# Patient Record
Sex: MALE | Race: WHITE | NOT HISPANIC OR LATINO | Employment: FULL TIME | URBAN - METROPOLITAN AREA
[De-identification: names, ages, dates, MRNs, and addresses within clinical notes are randomized per-mention and may not be internally consistent; named-entity substitution may affect disease eponyms.]

---

## 2019-04-13 ENCOUNTER — APPOINTMENT (OUTPATIENT)
Dept: RADIOLOGY | Facility: MEDICAL CENTER | Age: 24
End: 2019-04-13
Attending: EMERGENCY MEDICINE
Payer: COMMERCIAL

## 2019-04-13 ENCOUNTER — HOSPITAL ENCOUNTER (EMERGENCY)
Facility: MEDICAL CENTER | Age: 24
End: 2019-04-13
Attending: EMERGENCY MEDICINE
Payer: COMMERCIAL

## 2019-04-13 VITALS
DIASTOLIC BLOOD PRESSURE: 73 MMHG | OXYGEN SATURATION: 96 % | RESPIRATION RATE: 17 BRPM | HEART RATE: 70 BPM | HEIGHT: 72 IN | BODY MASS INDEX: 19.64 KG/M2 | SYSTOLIC BLOOD PRESSURE: 125 MMHG | WEIGHT: 145 LBS | TEMPERATURE: 98.8 F

## 2019-04-13 DIAGNOSIS — S62.339A CLOSED BOXER'S FRACTURE, INITIAL ENCOUNTER: ICD-10-CM

## 2019-04-13 PROCEDURE — 73130 X-RAY EXAM OF HAND: CPT | Mod: RT

## 2019-04-13 PROCEDURE — 302874 HCHG BANDAGE ACE 2 OR 3"": Mod: EDC

## 2019-04-13 PROCEDURE — 99284 EMERGENCY DEPT VISIT MOD MDM: CPT | Mod: EDC

## 2019-04-13 PROCEDURE — 29125 APPL SHORT ARM SPLINT STATIC: CPT | Mod: EDC

## 2019-04-13 NOTE — ED PROVIDER NOTES
ED Provider Note    CHIEF COMPLAINT  Chief Complaint   Patient presents with   • T-5000 Extremity Pain       HPI  Joni Travis Jr. is a 23 y.o. male who presents for evaluation of a right hand injury sustained 2 days ago when he punched a door.  Pain overlying the ulnar aspect of the hand, no weakness numbness or tingling, he denies any wrist pain or elbow pain.  He has no other complaints, right-hand dominant.    REVIEW OF SYSTEMS  Negative for fever, weakness, numbness, tingling    PAST MEDICAL HISTORY       SOCIAL HISTORY  Social History     Social History Main Topics   • Smoking status: Never Smoker   • Smokeless tobacco: Never Used   • Alcohol use Yes      Comment: 2-3 drinks every other day   • Drug use: No   • Sexual activity: Not on file       SURGICAL HISTORY  patient denies any surgical history    CURRENT MEDICATIONS  I personally reviewed the medication list in the charting documentation.     ALLERGIES  No Known Allergies    PHYSICAL EXAM  VITAL SIGNS: /72   Pulse 69   Temp 36.4 °C (97.5 °F) (Temporal)   Resp 17   Ht 1.829 m (6')   Wt 65.8 kg (145 lb)   SpO2 97%   BMI 19.67 kg/m²   Constitutional: Alert in no apparent distress.  HENT: No signs of trauma.   Eyes: Conjunctiva normal, Non-icteric.   Chest: Normal nonlabored respirations  Skin: No erythema, No rash.   Musculoskeletal: Edema and ecchymosis overlying the ulnar aspect of the right hand with near full range of motion but discomfort.  He has normal sensation and capillary refill distally.  Neurologic: Alert, No focal deficits noted.   Psychiatric: Affect normal, Judgment normal.    DIAGNOSTIC STUDIES / PROCEDURES    RADIOLOGY  DX-HAND 3+ RIGHT   Final Result      Acute, closed, fracture of the fifth metacarpal neck with volar angulation.            COURSE & MEDICAL DECISION MAKING  Pertinent Labs & Imaging studies reviewed. (See chart for details)    Encounter Summary: This is a 23 y.o. male with right hand injury after punching  a door, likely a fifth metacarpal fracture, she will be obtained, he is neurovascularly intact ------ x-ray confirms the presence of this fracture, the degree of angulation seems to be greater than 40 degrees based on my own measurements of the x-ray so a bedside reduction was performed, I discussed with the patient prior regarding any pain medicine or sedation and he elected to proceed without either, then applying traction to the fifth digit and direct manipulation to the region of the metacarpal head to reduce the volar angulation.  This was then splinted by myself and the tech in a ulnar gutter, he will follow-up with the orthopedic surgeon, return instructions discussed      DISPOSITION: Discharge Home      FINAL IMPRESSION  1. Closed boxer's fracture, initial encounter        This dictation was created using voice recognition software. The accuracy of the dictation is limited to the abilities of the software. I expect there may be some errors of grammar and possibly content. The nursing notes were reviewed and certain aspects of this information were incorporated into this note.    Electronically signed by: Benito Marcos, 4/13/2019 11:58 AM

## 2019-04-13 NOTE — ED NOTES
Pt ambulatory to Peds 53. Agree with triage RN note. Pt awake and alert. Pain and swelling to R lateral hand. CMS intact. Call light within reach. Denies additional needs. Up for ERP eval.

## 2019-04-13 NOTE — ED NOTES
Discharge instructions reviewed with pt regarding boxer's fracture.  Pt instructed on signs and symptoms to return to ED, instructed on importance of oral hydration, no questions regarding this.   Instructed to follow-up with   Goyo Espino M.D.  [address]  [address]  [place] [address]  [phone]    Schedule an appointment as soon as possible for a visit       Pt has no questions at this time, /73   Pulse 70   Temp 37.1 °C (98.8 °F) (Temporal)   Resp 17   Ht 1.829 m (6')   Wt 65.8 kg (145 lb)   SpO2 96%   BMI 19.67 kg/m²     Pt leaves alert, age appropriate and in NAD.

## 2019-04-13 NOTE — ED NOTES
Ulnar gutter placed on right arm with ERP assistance and verification. Extra padding around wrist and finger area, fingers 4 & 5 splinted to a slight curve. CMS intact, pt denies pain or discomfort. Instructed pt to re-wrap or remove if becomes painful or numb.

## 2019-04-20 ENCOUNTER — OFFICE VISIT (OUTPATIENT)
Dept: URGENT CARE | Facility: PHYSICIAN GROUP | Age: 24
End: 2019-04-20
Payer: COMMERCIAL

## 2019-04-20 VITALS
DIASTOLIC BLOOD PRESSURE: 70 MMHG | HEIGHT: 72 IN | TEMPERATURE: 98.5 F | HEART RATE: 63 BPM | BODY MASS INDEX: 19.64 KG/M2 | WEIGHT: 145 LBS | OXYGEN SATURATION: 98 % | RESPIRATION RATE: 13 BRPM | SYSTOLIC BLOOD PRESSURE: 108 MMHG

## 2019-04-20 DIAGNOSIS — J03.90 EXUDATIVE TONSILLITIS: ICD-10-CM

## 2019-04-20 LAB
INT CON NEG: NEGATIVE
INT CON POS: POSITIVE
S PYO AG THROAT QL: NEGATIVE

## 2019-04-20 PROCEDURE — 99204 OFFICE O/P NEW MOD 45 MIN: CPT | Performed by: FAMILY MEDICINE

## 2019-04-20 PROCEDURE — 87880 STREP A ASSAY W/OPTIC: CPT | Performed by: FAMILY MEDICINE

## 2019-04-20 RX ORDER — ACETAMINOPHEN 325 MG/1
650 TABLET ORAL EVERY 4 HOURS PRN
COMMUNITY
End: 2022-05-23

## 2019-04-20 RX ORDER — AMOXICILLIN 875 MG/1
875 TABLET, COATED ORAL 2 TIMES DAILY
Qty: 20 TAB | Refills: 0 | Status: SHIPPED | OUTPATIENT
Start: 2019-04-20 | End: 2019-04-30

## 2019-04-20 NOTE — PROGRESS NOTES
Subjective:     CC:  presents with Pharyngitis            Pharyngitis   This is a new problem. The current episode started in the past 7 days. The problem has been gradually worsening.  The pain is moderate.  + subj fever.  Associated symptoms include a hoarse voice, swollen glands and trouble swallowing. Pertinent negatives include no abdominal pain, congestion, coughing, diarrhea, headaches, shortness of breath or vomiting. no exposure to strep or mono. He has tried acetaminophen for the symptoms. The treatment provided mild relief.      Past medical history was unremarkable and not pertinent to current issue  Social History   Substance Use Topics   • Smoking status: Never Smoker   • Smokeless tobacco: Never Used   • Alcohol use Yes      Comment: 2-3 drinks every other day       Family hx was reviewed - no pertinent past family hx          Review of Systems   Constitutional: + for fever, chills and malaise/fatigue.   Eyes: Negative for vision changes, d/c.    Respiratory:  Denies sob,  cough and sputum production.    Cardiovascular: Negative for chest pain and palpitations.   Gastrointestinal: Negative for nausea, vomiting, abdominal pain, diarrhea and constipation.   Genitourinary: Negative for dysuria, urgency and frequency.   Skin: Negative for rash or  itching.   Neurological: Negative for dizziness and tingling.    Psychiatric/Behavioral: Negative for depression.   Hematologic/lymphatic - denies bruising or excessive bleeding  All other systems reviewed and are negative.       Objective:     /70   Pulse 63   Temp 36.9 °C (98.5 °F)   Resp 13   Ht 1.829 m (6')   Wt 65.8 kg (145 lb)   SpO2 98%       Physical Exam   Constitutional:   oriented to person, place, and time.  appears well-developed and well-nourished. No distress.   HENT:   Head: Normocephalic and atraumatic.   Right Ear: External ear normal.   Left Ear: External ear normal.   Nose: Mucosal edema present. Right sinus exhibits no maxillary  sinus tenderness and no frontal sinus tenderness. Left sinus exhibits no maxillary sinus tenderness and no frontal sinus tenderness.   Mouth/Throat: Oropharyngeal exudate and posterior oropharyngeal erythema present. No posterior oropharyngeal edema.   Tonsils 3+ bilaterally     Eyes: Conjunctivae and EOM are normal. Pupils are equal, round, and reactive to light. Right eye exhibits no discharge. Left eye exhibits no discharge. No scleral icterus.   Neck: Normal range of motion. Neck supple. No JVD present. No tracheal deviation present. No thyromegaly present.   Cardiovascular: Normal rate, regular rhythm, normal heart sounds and intact distal pulses.  Exam reveals no friction rub.    No murmur heard.  Pulmonary/Chest: Effort normal and breath sounds normal. No respiratory distress.   no wheezes.   no rales.    Musculoskeletal:  exhibits no edema.   Lymphadenopathy:     Positive Cervical adenopathy.   Neurological:   alert and oriented to person, place, and time.   Skin: Skin is warm and dry. No erythema.   Psychiatric:   normal mood and affect.   Nursing note and vitals reviewed.       rapid strep neg      Assessment/Plan:     1. Exudative tonsillitis     - amoxicillin (AMOXIL) 875 MG tablet; Take 1 Tab by mouth 2 times a day for 10 days.  Dispense: 20 Tab; Refill: 0      Follow up in one week if no improvement, sooner if symptoms worsen.

## 2019-07-22 ENCOUNTER — OFFICE VISIT (OUTPATIENT)
Dept: URGENT CARE | Facility: CLINIC | Age: 24
End: 2019-07-22
Payer: COMMERCIAL

## 2019-07-22 VITALS
WEIGHT: 144.6 LBS | BODY MASS INDEX: 19.59 KG/M2 | OXYGEN SATURATION: 99 % | SYSTOLIC BLOOD PRESSURE: 122 MMHG | RESPIRATION RATE: 16 BRPM | DIASTOLIC BLOOD PRESSURE: 82 MMHG | HEIGHT: 72 IN | HEART RATE: 70 BPM | TEMPERATURE: 97.6 F

## 2019-07-22 DIAGNOSIS — J30.2 SEASONAL ALLERGIES: ICD-10-CM

## 2019-07-22 PROCEDURE — 99214 OFFICE O/P EST MOD 30 MIN: CPT | Performed by: PHYSICIAN ASSISTANT

## 2019-07-22 RX ORDER — IMIQUIMOD 12.5 MG/.25G
CREAM TOPICAL
Refills: 3 | COMMUNITY
Start: 2019-06-17 | End: 2022-05-23

## 2019-07-22 RX ORDER — FLUTICASONE PROPIONATE 50 MCG
1 SPRAY, SUSPENSION (ML) NASAL DAILY
Qty: 16 G | Refills: 0 | Status: SHIPPED | OUTPATIENT
Start: 2019-07-22 | End: 2022-05-23

## 2019-07-22 ASSESSMENT — ENCOUNTER SYMPTOMS
GASTROINTESTINAL NEGATIVE: 1
CHILLS: 0
SPUTUM PRODUCTION: 0
HEADACHES: 1
SWOLLEN GLANDS: 0
SINUS PRESSURE: 0
WHEEZING: 0
COUGH: 1
FEVER: 0
SHORTNESS OF BREATH: 0
SORE THROAT: 0
MUSCULOSKELETAL NEGATIVE: 1
DIZZINESS: 0
CARDIOVASCULAR NEGATIVE: 1

## 2019-07-23 NOTE — PROGRESS NOTES
Subjective:      Joni Travis Jr. is a 24 y.o. male who presents with Sinus Problem (x 2 months, Stuffy and runny nose, little cough)            Sinus Problem   This is a new problem. The current episode started more than 1 month ago. The problem has been waxing and waning since onset. There has been no fever. The fever has been present for less than 1 day. Associated symptoms include congestion, coughing, headaches and sneezing. Pertinent negatives include no chills, ear pain, shortness of breath, sinus pressure, sore throat or swollen glands. Past treatments include nothing. The treatment provided no relief.       PMH:  has no past medical history on file.  MEDS:   Current Outpatient Prescriptions:   •  imiquimod (ALDARA) 5 % cream, APPLY TO AFFECTED AREA(S) 3 TIMES EVERY WEEK AND BEFORE BEDTIME AND LEAVE ON SKIN FOR 6 TO 10 HRS, Disp: , Rfl: 3  •  acetaminophen (TYLENOL) 325 MG Tab, Take 650 mg by mouth every four hours as needed., Disp: , Rfl:   ALLERGIES: No Known Allergies  SURGHX: No past surgical history on file.  SOCHX:  reports that he has never smoked. He has never used smokeless tobacco. He reports that he drinks alcohol. He reports that he does not use drugs.  FH: family history is not on file.    Review of Systems   Constitutional: Negative for chills and fever.   HENT: Positive for congestion and sneezing. Negative for ear pain, sinus pressure and sore throat.    Respiratory: Positive for cough. Negative for sputum production, shortness of breath and wheezing.    Cardiovascular: Negative.    Gastrointestinal: Negative.    Musculoskeletal: Negative.    Neurological: Positive for headaches. Negative for dizziness.       Medications, Allergies, and current problem list reviewed today in Epic     Objective:     /82 (BP Location: Right arm, Patient Position: Sitting, BP Cuff Size: Adult)   Pulse 70   Temp 36.4 °C (97.6 °F) (Temporal)   Resp 16   Ht 1.829 m (6')   Wt 65.6 kg (144 lb 9.6 oz)    SpO2 99%   BMI 19.61 kg/m²      Physical Exam   Constitutional: He is oriented to person, place, and time. He appears well-developed and well-nourished. No distress.   HENT:   Head: Normocephalic and atraumatic.   Right Ear: Tympanic membrane and external ear normal.   Left Ear: Tympanic membrane and external ear normal.   Nose: Nose normal.   Mouth/Throat: Oropharynx is clear and moist. No oropharyngeal exudate.   Eyes: Pupils are equal, round, and reactive to light. Conjunctivae and EOM are normal. Right eye exhibits no discharge. Left eye exhibits no discharge.   Neck: Normal range of motion. Neck supple.   Cardiovascular: Normal rate, regular rhythm and normal heart sounds.    No murmur heard.  Pulmonary/Chest: Effort normal and breath sounds normal. No respiratory distress. He has no wheezes. He has no rales.   Lymphadenopathy:     He has no cervical adenopathy.   Neurological: He is alert and oriented to person, place, and time.   Skin: Skin is warm and dry. He is not diaphoretic.   Psychiatric: He has a normal mood and affect. His behavior is normal. Judgment and thought content normal.   Nursing note and vitals reviewed.              Assessment/Plan:     1. Seasonal allergies  fluticasone (FLONASE) 50 MCG/ACT nasal spray     Vitals normal, exam unremarkable no signs of bacterial or viral infection.  Symptoms are coming and going.  Consistent with seasonal allergies  OTC meds and conservative measures as discussed    Return to clinic or go to ED if symptoms worsen or persist. Indications for ED discussed at length. Patient voices understanding. Follow-up with your primary care provider in 3-5 days. Red flags discussed. All side effects of medication discussed including allergic response, GI upset, tendon injury, etc.    Please note that this dictation was created using voice recognition software. I have made every reasonable attempt to correct obvious errors, but I expect that there are errors of grammar  and possibly content that I did not discover before finalizing the note.

## 2020-09-30 ENCOUNTER — OFFICE VISIT (OUTPATIENT)
Dept: URGENT CARE | Facility: PHYSICIAN GROUP | Age: 25
End: 2020-09-30
Payer: COMMERCIAL

## 2020-09-30 VITALS
DIASTOLIC BLOOD PRESSURE: 84 MMHG | OXYGEN SATURATION: 100 % | HEART RATE: 71 BPM | TEMPERATURE: 98 F | SYSTOLIC BLOOD PRESSURE: 120 MMHG | WEIGHT: 144 LBS | HEIGHT: 72 IN | RESPIRATION RATE: 18 BRPM | BODY MASS INDEX: 19.5 KG/M2
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
## 2020-09-30 DIAGNOSIS — H57.89 PERIORBITAL SWELLING: ICD-10-CM

## 2020-09-30 DIAGNOSIS — S00.261A INSECT BITE OF RIGHT EYELID, INITIAL ENCOUNTER: ICD-10-CM

## 2020-09-30 PROCEDURE — 99214 OFFICE O/P EST MOD 30 MIN: CPT | Performed by: NURSE PRACTITIONER

## 2020-09-30 RX ORDER — CEPHALEXIN 500 MG/1
500 CAPSULE ORAL 4 TIMES DAILY
Qty: 28 CAP | Refills: 0 | Status: SHIPPED | OUTPATIENT
Start: 2020-09-30 | End: 2020-10-07

## 2020-09-30 ASSESSMENT — ENCOUNTER SYMPTOMS
SINUS PAIN: 0
EYE DISCHARGE: 0
MYALGIAS: 0
COUGH: 0
DOUBLE VISION: 0
HEADACHES: 0
WEAKNESS: 0
BLURRED VISION: 0
SORE THROAT: 0
PHOTOPHOBIA: 0
EYE PAIN: 0
CHILLS: 0
EYE REDNESS: 1
FEVER: 0
DIZZINESS: 0

## 2020-09-30 NOTE — PROGRESS NOTES
Subjective:      Join Travis Jr. is a 25 y.o. male who presents with Eye Swelling (R eye [id number] )            HPI  C/o right eye swelling and redness x 2 days. States possible insect bite to right eyelid. Denies vision change, itchiness or pain, no sensation of foreign material in eye. Denies eye d/c. No otc med used or cool compress to eye.    PMH:  has no past medical history on file.  MEDS:   Current Outpatient Medications:   •  cephALEXin (KEFLEX) 500 MG Cap, Take 1 Cap by mouth 4 times a day for 7 days., Disp: 28 Cap, Rfl: 0  •  imiquimod (ALDARA) 5 % cream, APPLY TO AFFECTED AREA(S) 3 TIMES EVERY WEEK AND BEFORE BEDTIME AND LEAVE ON SKIN FOR 6 TO 10 HRS, Disp: , Rfl: 3  •  fluticasone (FLONASE) 50 MCG/ACT nasal spray, Spray 1 Spray in nose every day. (Patient not taking: Reported on 9/30/2020), Disp: 16 g, Rfl: 0  •  acetaminophen (TYLENOL) 325 MG Tab, Take 650 mg by mouth every four hours as needed., Disp: , Rfl:   ALLERGIES: No Known Allergies  SURGHX: History reviewed. No pertinent surgical history.  SOCHX:  reports that he has never smoked. He has never used smokeless tobacco. He reports current alcohol use. He reports that he does not use drugs.  FH: Family history was reviewed, no pertinent findings to report    Review of Systems   Constitutional: Negative for chills, fever and malaise/fatigue.   HENT: Negative for congestion, ear pain, sinus pain and sore throat.    Eyes: Positive for redness. Negative for blurred vision, double vision, photophobia, pain and discharge.        Swelling to right upper eyelid.   Respiratory: Negative for cough.    Musculoskeletal: Negative for myalgias.   Skin: Negative for itching and rash.   Neurological: Negative for dizziness, weakness and headaches.   Endo/Heme/Allergies: Negative for environmental allergies.   All other systems reviewed and are negative.         Objective:     /84   Pulse 71   Temp 36.7 °C (98 °F) (Temporal)   Resp 18   Ht  1.829 m (6')   Wt 65.3 kg (144 lb)   SpO2 100%   BMI 19.53 kg/m²      Physical Exam  Vitals signs reviewed.   Constitutional:       General: He is awake. He is not in acute distress.     Appearance: Normal appearance. He is well-developed. He is not ill-appearing, toxic-appearing or diaphoretic.   HENT:      Head: Normocephalic.   Eyes:      General:         Right eye: No foreign body, discharge or hordeolum.      Extraocular Movements: Extraocular movements intact.      Conjunctiva/sclera:      Right eye: Right conjunctiva is not injected. Chemosis present. No exudate or hemorrhage.     Comments: Periorbital swelling and redness of right upper eyelid. Mild conjunctival redness with d/c. No excessive tearing or blinking.   Neck:      Musculoskeletal: Normal range of motion and neck supple.   Cardiovascular:      Rate and Rhythm: Normal rate.   Pulmonary:      Effort: Pulmonary effort is normal.   Musculoskeletal: Normal range of motion.   Skin:     General: Skin is warm and dry.   Neurological:      Mental Status: He is alert and oriented to person, place, and time.   Psychiatric:         Behavior: Behavior is cooperative.                 Assessment/Plan:        1. Periorbital swelling    - cephALEXin (KEFLEX) 500 MG Cap; Take 1 Cap by mouth 4 times a day for 7 days.  Dispense: 28 Cap; Refill: 0    2. Insect bite of right eyelid, initial encounter  May use Benadryl x 2 days  May use longer acting antihistamine x 5 days  May use cool compresses for eye swelling  Avoid touching eyes  May clean eyes with mild dilute soap along eyelash line with eyes closed, rinse with plenty of water  May use saline eye rinse or eye irrigation solution prn for any eye dryness  Monitor for increase in redness or swelling, vision change, eye pain- need re-evaluation

## 2022-05-19 ENCOUNTER — TELEPHONE (OUTPATIENT)
Dept: SCHEDULING | Facility: IMAGING CENTER | Age: 27
End: 2022-05-19

## 2022-05-23 ENCOUNTER — OFFICE VISIT (OUTPATIENT)
Dept: MEDICAL GROUP | Facility: LAB | Age: 27
End: 2022-05-23
Payer: COMMERCIAL

## 2022-05-23 VITALS
TEMPERATURE: 98.5 F | DIASTOLIC BLOOD PRESSURE: 68 MMHG | OXYGEN SATURATION: 99 % | HEART RATE: 72 BPM | SYSTOLIC BLOOD PRESSURE: 122 MMHG | HEIGHT: 73 IN | WEIGHT: 148 LBS | BODY MASS INDEX: 19.61 KG/M2 | RESPIRATION RATE: 16 BRPM

## 2022-05-23 DIAGNOSIS — F32.A ANXIETY AND DEPRESSION: ICD-10-CM

## 2022-05-23 DIAGNOSIS — Z13.29 THYROID DISORDER SCREENING: ICD-10-CM

## 2022-05-23 DIAGNOSIS — F41.9 ANXIETY AND DEPRESSION: ICD-10-CM

## 2022-05-23 DIAGNOSIS — Z23 NEED FOR VACCINATION: ICD-10-CM

## 2022-05-23 DIAGNOSIS — R68.82 DECREASED SEX DRIVE: ICD-10-CM

## 2022-05-23 DIAGNOSIS — Z13.220 LIPID SCREENING: ICD-10-CM

## 2022-05-23 DIAGNOSIS — Z76.89 ENCOUNTER TO ESTABLISH CARE: ICD-10-CM

## 2022-05-23 PROBLEM — A60.00 HERPES SIMPLEX INFECTION OF GENITOURINARY SYSTEM: Status: ACTIVE | Noted: 2022-05-23

## 2022-05-23 PROCEDURE — 90651 9VHPV VACCINE 2/3 DOSE IM: CPT | Performed by: PHYSICIAN ASSISTANT

## 2022-05-23 PROCEDURE — 90471 IMMUNIZATION ADMIN: CPT | Performed by: PHYSICIAN ASSISTANT

## 2022-05-23 PROCEDURE — 99385 PREV VISIT NEW AGE 18-39: CPT | Mod: 25 | Performed by: PHYSICIAN ASSISTANT

## 2022-05-23 RX ORDER — VALACYCLOVIR HYDROCHLORIDE 500 MG/1
500 TABLET, FILM COATED ORAL DAILY
COMMUNITY
Start: 2022-03-14 | End: 2022-08-29 | Stop reason: SDUPTHER

## 2022-05-23 ASSESSMENT — ANXIETY QUESTIONNAIRES
2. NOT BEING ABLE TO STOP OR CONTROL WORRYING: MORE THAN HALF THE DAYS
IF YOU CHECKED OFF ANY PROBLEMS ON THIS QUESTIONNAIRE, HOW DIFFICULT HAVE THESE PROBLEMS MADE IT FOR YOU TO DO YOUR WORK, TAKE CARE OF THINGS AT HOME, OR GET ALONG WITH OTHER PEOPLE: SOMEWHAT DIFFICULT
4. TROUBLE RELAXING: NEARLY EVERY DAY
GAD7 TOTAL SCORE: 13
6. BECOMING EASILY ANNOYED OR IRRITABLE: SEVERAL DAYS
1. FEELING NERVOUS, ANXIOUS, OR ON EDGE: MORE THAN HALF THE DAYS
7. FEELING AFRAID AS IF SOMETHING AWFUL MIGHT HAPPEN: SEVERAL DAYS
3. WORRYING TOO MUCH ABOUT DIFFERENT THINGS: MORE THAN HALF THE DAYS
5. BEING SO RESTLESS THAT IT IS HARD TO SIT STILL: MORE THAN HALF THE DAYS

## 2022-05-23 ASSESSMENT — PATIENT HEALTH QUESTIONNAIRE - PHQ9
CLINICAL INTERPRETATION OF PHQ2 SCORE: 4
SUM OF ALL RESPONSES TO PHQ QUESTIONS 1-9: 16
5. POOR APPETITE OR OVEREATING: 2 - MORE THAN HALF THE DAYS

## 2022-05-23 NOTE — PROGRESS NOTES
Subjective:     CC:  Diagnoses of Encounter to establish care, Lipid screening, Thyroid disorder screening, Need for vaccination, Anxiety and depression, and Decreased sex drive were pertinent to this visit.    HISTORY OF THE PRESENT ILLNESS: Patient is a 26 y.o. male. This pleasant patient is here today to establish care and discuss anxiety/depression/sex drive. His/her prior PCP was none.    Concerns about sex drive  Started a few years ago as decreased sex drive and issues getting/maintaining an erection. He is not sure if he experiences nocturnal tumescence.   Denies weight, skin, hair changes, denies temperature intolerance.     Hx of anxiety/depression  No medications currently, no previous treatment  Issues with both anxiety and depression since middle school. Initially more anxiety, then more depressive. At this time he reports he largely experiences mostly anxiety symptoms. He relates remote hx of panic attacks. He relates issues with falling asleep, often ruminating. His diet is poor but appetite is good overall.    approx 15-20 alcoholic drinks per week    No thoughts of HI, no specific plan, more of a passive death wish  PHQ-9: 16  CAMILA-7: 13    Health Maintenance     - Dyslipidemia (30-45): ordered today  - Diabetes (HTN, HLD, BMI >25): ordered today  - Depression screening (PHQ-2 and/or PHQ-9): see above  - Dental: none  - Eye: none  Diet: poor  Exercise: mostly sedentary  Substance Use: 20+ drinks per week, MJ  Tobacco Use/counseling: denies  Safe in relationship: feels safe at home  Seat belts, bike helmet, gun safety discussed.  Sun protection used.     Infectious disease screening/Immunizations  --Immunizations: due for HPV, covid vax    Current Outpatient Medications Ordered in Epic   Medication Sig Dispense Refill   • valACYclovir (VALTREX) 500 MG Tab Take 500 mg by mouth every day.       No current [facility]-ordered facility-administered medications on file.       Health Maintenance: Completed    ROS:  "  Gen: no fevers/chills, no changes in weight  Eyes: no changes in vision  ENT: no sore throat, no hearing loss, no bloody nose  Pulm: no sob, no cough  CV: no chest pain, no palpitations  GI: no nausea/vomiting, no diarrhea  : no dysuria  MSk: no myalgias  Skin: no rash  Neuro: no headaches, no numbness/tingling  Heme/Lymph: no easy bruising      Objective:       Exam: /68   Pulse 72   Temp 36.9 °C (98.5 °F)   Resp 16   Ht 1.854 m (6' 1\")   Wt 67.1 kg (148 lb)   SpO2 99%  Body mass index is 19.53 kg/m².    General: Normal appearing. No distress.  HEENT: Normocephalic. Eyes conjunctiva clear lids without ptosis, pupils equal and reactive to light accommodation, ears normal shape and contour, canals are clear bilaterally, tympanic membranes are benign, nasal mucosa benign, oropharynx is without erythema, edema or exudates.   Neck: Supple without JVD or bruit. Thyroid is not enlarged.  Pulmonary: Clear to ausculation.  Normal effort. No rales, ronchi, or wheezing.  Cardiovascular: Regular rate and rhythm without murmur. Carotid and radial pulses are intact and equal bilaterally.  Abdomen: Soft, nontender, nondistended. Normal bowel sounds. Liver and spleen are not palpable  Neurologic: Grossly nonfocal  Lymph: No cervical or supraclavicular lymph nodes are palpable  Skin: Warm and dry.  No obvious lesions.  Musculoskeletal: Normal gait. No extremity cyanosis, clubbing, or edema.  Psych: Normal mood and affect. Alert and oriented x3. Judgment and insight is normal.      Assessment & Plan:   26 y.o. male with the following -    1. Encounter to establish care  Labs per orders  Vaccinations per orders  Screenings per orders    2. Lipid screening  - CBC WITH DIFFERENTIAL; Future  - Comp Metabolic Panel; Future  - Lipid Profile; Future    3. Thyroid disorder screening  - CBC WITH DIFFERENTIAL; Future  - Comp Metabolic Panel; Future  - TSH WITH REFLEX TO FT4; Future    4. Need for vaccination  - Gardasil " 9    5. Anxiety and depression  6. Decreased sex drive  Chronic condition, new to me  Suspect that issues with sex drive are related to anxiety/depression. Discussed medications today, pt would prefer to try counseling first. Discussed passive death wish today, pt denies specific plan or intention. We will also check a testosterone to f/u physiological causes.   - Referral to Behavioral Health  - Testosterone, Free & Total, Adult Male (w/SHBG); Future        I spent a total of 20 minutes with record review, exam, communication with the patient, communication with other providers, and documentation of this encounter.    Return in about 4 weeks (around 6/20/2022).    Please note that this dictation was created using voice recognition software. I have made every reasonable attempt to correct obvious errors, but I expect that there are errors of grammar and possibly content that I did not discover before finalizing the note.

## 2022-05-24 PROBLEM — Z77.22 SECOND HAND SMOKE EXPOSURE: Status: ACTIVE | Noted: 2022-05-24

## 2022-06-24 ENCOUNTER — HOSPITAL ENCOUNTER (OUTPATIENT)
Dept: LAB | Facility: MEDICAL CENTER | Age: 27
End: 2022-06-24
Attending: PHYSICIAN ASSISTANT
Payer: COMMERCIAL

## 2022-06-24 DIAGNOSIS — F32.A ANXIETY AND DEPRESSION: ICD-10-CM

## 2022-06-24 DIAGNOSIS — Z13.29 THYROID DISORDER SCREENING: ICD-10-CM

## 2022-06-24 DIAGNOSIS — R68.82 DECREASED SEX DRIVE: ICD-10-CM

## 2022-06-24 DIAGNOSIS — F41.9 ANXIETY AND DEPRESSION: ICD-10-CM

## 2022-06-24 DIAGNOSIS — Z13.220 LIPID SCREENING: ICD-10-CM

## 2022-06-24 LAB
ALBUMIN SERPL BCP-MCNC: 4.8 G/DL (ref 3.2–4.9)
ALBUMIN/GLOB SERPL: 2.3 G/DL
ALP SERPL-CCNC: 66 U/L (ref 30–99)
ALT SERPL-CCNC: 18 U/L (ref 2–50)
ANION GAP SERPL CALC-SCNC: 10 MMOL/L (ref 7–16)
AST SERPL-CCNC: 17 U/L (ref 12–45)
BASOPHILS # BLD AUTO: 0.8 % (ref 0–1.8)
BASOPHILS # BLD: 0.05 K/UL (ref 0–0.12)
BILIRUB SERPL-MCNC: 1 MG/DL (ref 0.1–1.5)
BUN SERPL-MCNC: 15 MG/DL (ref 8–22)
CALCIUM SERPL-MCNC: 9.5 MG/DL (ref 8.5–10.5)
CHLORIDE SERPL-SCNC: 100 MMOL/L (ref 96–112)
CHOLEST SERPL-MCNC: 173 MG/DL (ref 100–199)
CO2 SERPL-SCNC: 26 MMOL/L (ref 20–33)
CREAT SERPL-MCNC: 0.95 MG/DL (ref 0.5–1.4)
EOSINOPHIL # BLD AUTO: 0.17 K/UL (ref 0–0.51)
EOSINOPHIL NFR BLD: 2.6 % (ref 0–6.9)
ERYTHROCYTE [DISTWIDTH] IN BLOOD BY AUTOMATED COUNT: 43.2 FL (ref 35.9–50)
GFR SERPLBLD CREATININE-BSD FMLA CKD-EPI: 113 ML/MIN/1.73 M 2
GLOBULIN SER CALC-MCNC: 2.1 G/DL (ref 1.9–3.5)
GLUCOSE SERPL-MCNC: 101 MG/DL (ref 65–99)
HCT VFR BLD AUTO: 46.8 % (ref 42–52)
HDLC SERPL-MCNC: 69 MG/DL
HGB BLD-MCNC: 16.8 G/DL (ref 14–18)
IMM GRANULOCYTES # BLD AUTO: 0.03 K/UL (ref 0–0.11)
IMM GRANULOCYTES NFR BLD AUTO: 0.5 % (ref 0–0.9)
LDLC SERPL CALC-MCNC: 88 MG/DL
LYMPHOCYTES # BLD AUTO: 2.34 K/UL (ref 1–4.8)
LYMPHOCYTES NFR BLD: 35.1 % (ref 22–41)
MCH RBC QN AUTO: 34.3 PG (ref 27–33)
MCHC RBC AUTO-ENTMCNC: 35.9 G/DL (ref 33.7–35.3)
MCV RBC AUTO: 95.5 FL (ref 81.4–97.8)
MONOCYTES # BLD AUTO: 0.58 K/UL (ref 0–0.85)
MONOCYTES NFR BLD AUTO: 8.7 % (ref 0–13.4)
NEUTROPHILS # BLD AUTO: 3.49 K/UL (ref 1.82–7.42)
NEUTROPHILS NFR BLD: 52.3 % (ref 44–72)
NRBC # BLD AUTO: 0 K/UL
NRBC BLD-RTO: 0 /100 WBC
PLATELET # BLD AUTO: 228 K/UL (ref 164–446)
PMV BLD AUTO: 9.1 FL (ref 9–12.9)
POTASSIUM SERPL-SCNC: 4.3 MMOL/L (ref 3.6–5.5)
PROT SERPL-MCNC: 6.9 G/DL (ref 6–8.2)
RBC # BLD AUTO: 4.9 M/UL (ref 4.7–6.1)
SODIUM SERPL-SCNC: 136 MMOL/L (ref 135–145)
TRIGL SERPL-MCNC: 79 MG/DL (ref 0–149)
WBC # BLD AUTO: 6.7 K/UL (ref 4.8–10.8)

## 2022-06-24 PROCEDURE — 84403 ASSAY OF TOTAL TESTOSTERONE: CPT

## 2022-06-24 PROCEDURE — 85025 COMPLETE CBC W/AUTO DIFF WBC: CPT

## 2022-06-24 PROCEDURE — 80053 COMPREHEN METABOLIC PANEL: CPT

## 2022-06-24 PROCEDURE — 80061 LIPID PANEL: CPT

## 2022-06-24 PROCEDURE — 84270 ASSAY OF SEX HORMONE GLOBUL: CPT

## 2022-06-24 PROCEDURE — 84443 ASSAY THYROID STIM HORMONE: CPT

## 2022-06-24 PROCEDURE — 84402 ASSAY OF FREE TESTOSTERONE: CPT

## 2022-06-24 PROCEDURE — 36415 COLL VENOUS BLD VENIPUNCTURE: CPT

## 2022-06-25 LAB — TSH SERPL DL<=0.005 MIU/L-ACNC: 1.67 UIU/ML (ref 0.38–5.33)

## 2022-06-27 LAB
SHBG SERPL-SCNC: 39 NMOL/L (ref 17–56)
TESTOST FREE MFR SERPL: 1.9 % (ref 1.6–2.9)
TESTOST FREE SERPL-MCNC: 158 PG/ML (ref 47–244)
TESTOST SERPL-MCNC: 839 NG/DL (ref 300–1080)

## 2022-06-29 ENCOUNTER — OFFICE VISIT (OUTPATIENT)
Dept: MEDICAL GROUP | Facility: LAB | Age: 27
End: 2022-06-29
Payer: COMMERCIAL

## 2022-06-29 VITALS
SYSTOLIC BLOOD PRESSURE: 112 MMHG | RESPIRATION RATE: 16 BRPM | HEIGHT: 73 IN | BODY MASS INDEX: 19.61 KG/M2 | WEIGHT: 148 LBS | DIASTOLIC BLOOD PRESSURE: 66 MMHG | HEART RATE: 60 BPM | TEMPERATURE: 98 F | OXYGEN SATURATION: 96 %

## 2022-06-29 DIAGNOSIS — Z23 NEED FOR VACCINATION: ICD-10-CM

## 2022-06-29 PROCEDURE — 90651 9VHPV VACCINE 2/3 DOSE IM: CPT | Performed by: PHYSICIAN ASSISTANT

## 2022-06-29 PROCEDURE — 90471 IMMUNIZATION ADMIN: CPT | Performed by: PHYSICIAN ASSISTANT

## 2022-06-29 ASSESSMENT — FIBROSIS 4 INDEX: FIB4 SCORE: 0.47

## 2022-06-29 NOTE — PROGRESS NOTES
"Subjective:     CC: 1 month f/u    HPI:   Joni here today with     Anxiety/Depression  No changes in symptoms. Referral processed. Pt plans to call them to schedule later this week    Recent labs within normal limits.     Needs HPV dose 2/3    ROS:  Gen: no fevers/chills, no changes in weight  Eyes: no changes in vision  ENT: no sore throat, no hearing loss, no bloody nose  Pulm: no sob, no cough  CV: no chest pain, no palpitations  GI: no nausea/vomiting, no diarrhea  : no dysuria  MSk: no myalgias  Skin: no rash  Neuro: no headaches, no numbness/tingling  Heme/Lymph: no easy bruising    Current Outpatient Medications Ordered in Epic   Medication Sig Dispense Refill   • valACYclovir (VALTREX) 500 MG Tab Take 500 mg by mouth every day.       No current [facility]-ordered facility-administered medications on file.           Objective:     Exam:  /66   Pulse 60   Temp 36.7 °C (98 °F)   Resp 16   Ht 1.854 m (6' 1\")   Wt 67.1 kg (148 lb)   SpO2 96%   BMI 19.53 kg/m²  Body mass index is 19.53 kg/m².    Gen: Alert and oriented, No apparent distress.  Neck: Neck is supple without lymphadenopathy.  Lungs: Normal effort, CTA bilaterally, no wheezes, rhonchi, or rales  CV: Regular rate and rhythm. No murmurs, rubs, or gallops.  Ext: No clubbing, cyanosis, edema.      Assessment & Plan:     27 y.o. male with the following -     1. Need for vaccination    - Gardasil 9      I spent a total of 15 minutes with record review, exam, communication with the patient, communication with other providers, and documentation of this encounter.      No follow-ups on file.    Please note that this dictation was created using voice recognition software. I have made every reasonable attempt to correct obvious errors, but there may be errors of grammar and possibly content that I did not discover before finalizing the note.      "

## 2022-08-29 RX ORDER — VALACYCLOVIR HYDROCHLORIDE 500 MG/1
500 TABLET, FILM COATED ORAL DAILY
Qty: 40 TABLET | Refills: 0 | Status: SHIPPED | OUTPATIENT
Start: 2022-08-29 | End: 2022-09-21

## 2022-09-21 RX ORDER — VALACYCLOVIR HYDROCHLORIDE 500 MG/1
500 TABLET, FILM COATED ORAL DAILY
Qty: 30 TABLET | Refills: 1 | Status: SHIPPED | OUTPATIENT
Start: 2022-09-21 | End: 2022-10-17

## 2022-10-17 RX ORDER — VALACYCLOVIR HYDROCHLORIDE 500 MG/1
500 TABLET, FILM COATED ORAL DAILY
Qty: 90 TABLET | Refills: 1 | Status: SHIPPED | OUTPATIENT
Start: 2022-10-17 | End: 2023-02-20 | Stop reason: SDUPTHER

## 2022-10-17 NOTE — TELEPHONE ENCOUNTER
Received request via: Pharmacy    Was the patient seen in the last year in this department? Yes    Does the patient have an active prescription (recently filled or refills available) for medication(s) requested? No    Pharmacy comment: REQUEST FOR 90 DAYS PRESCRIPTION

## 2023-02-21 RX ORDER — VALACYCLOVIR HYDROCHLORIDE 500 MG/1
500 TABLET, FILM COATED ORAL DAILY
Qty: 90 TABLET | Refills: 1 | Status: SHIPPED | OUTPATIENT
Start: 2023-02-21 | End: 2023-12-21

## 2023-12-21 RX ORDER — VALACYCLOVIR HYDROCHLORIDE 500 MG/1
500 TABLET, FILM COATED ORAL DAILY
Qty: 90 TABLET | Refills: 1 | Status: SHIPPED | OUTPATIENT
Start: 2023-12-21

## 2023-12-21 NOTE — TELEPHONE ENCOUNTER
Received request via: Pharmacy    Was the patient seen in the last year in this department? No. Last seen 06/29/2022    Does the patient have an active prescription (recently filled or refills available) for medication(s) requested? No    Does the patient have custodial Plus and need 100 day supply (blood pressure, diabetes and cholesterol meds only)? Patient does not have SCP